# Patient Record
Sex: FEMALE | Race: WHITE | NOT HISPANIC OR LATINO | Employment: FULL TIME | URBAN - METROPOLITAN AREA
[De-identification: names, ages, dates, MRNs, and addresses within clinical notes are randomized per-mention and may not be internally consistent; named-entity substitution may affect disease eponyms.]

---

## 2017-06-01 ENCOUNTER — ALLSCRIPTS OFFICE VISIT (OUTPATIENT)
Dept: OTHER | Facility: OTHER | Age: 52
End: 2017-06-01

## 2017-10-11 ENCOUNTER — APPOINTMENT (OUTPATIENT)
Dept: RADIOLOGY | Facility: CLINIC | Age: 52
End: 2017-10-11
Payer: COMMERCIAL

## 2017-10-11 ENCOUNTER — OFFICE VISIT (OUTPATIENT)
Dept: URGENT CARE | Facility: CLINIC | Age: 52
End: 2017-10-11
Payer: COMMERCIAL

## 2017-10-11 ENCOUNTER — TRANSCRIBE ORDERS (OUTPATIENT)
Dept: URGENT CARE | Facility: CLINIC | Age: 52
End: 2017-10-11

## 2017-10-11 DIAGNOSIS — R05.9 COUGH: ICD-10-CM

## 2017-10-11 PROCEDURE — 94640 AIRWAY INHALATION TREATMENT: CPT

## 2017-10-11 PROCEDURE — 99213 OFFICE O/P EST LOW 20 MIN: CPT

## 2017-10-11 PROCEDURE — 71020 HB CHEST X-RAY 2VW FRONTAL&LATL: CPT

## 2017-10-13 NOTE — PROGRESS NOTES
Assessment  1  Asthmatic bronchitis (493 90) (J45 909)    Plan  Asthmatic bronchitis    · Albuterol Sulfate (2 5 MG/3ML) 0 083% Inhalation Nebulization Solution; USE 1  UNIT DOSE EVERY 4-6 HOURS AS NEEDED FOR WHEEZING    · Azithromycin 250 MG Oral Tablet (Zithromax Z-David); TAKE 2 TABLETS ON DAY 1  THEN TAKE 1 TABLET A DAY FOR 4 DAYS   · DrRx Phenergan with Codeine 118 ML; 1 tsp every 4 hrs as needed  Cough    · * XR CHEST PA & LATERAL; Status:Active - Retrospective By Protocol Authorization; Requested for:11Oct2017;     Discussion/Summary  Discussion Summary:   Increase fluids  Humidifier or vaporizer in the bedroom  Albuterol as needed for tightness and wheezing  Call the family doctor tomorrow and advise him of your condition  Understands and agrees with treatment plan: The treatment plan was reviewed with the patient/guardian  The patient/guardian understands and agrees with the treatment plan   Counseling Documentation With Imm: The patient, patient's family was counseled regarding instructions for management  Chief Complaint  1  Cough  Chief Complaint Free Text Note Form: Pt reports 1 week of a sore throat which has improved  Tmax at home is 100 9  She has a NP cough  Today she developed tightness in her breathing and wheezing  She started amoxicillin on Sunday  History of Present Illness  HPI: Short of breath, lungs feel tight  Note, a history of non-Hodgkin's lymphoma in 2005 which began with a cough and shortness of breath  Hospital Based Practices Required Assessment:   Pain Assessment   the patient states they have pain  The pain is located in the back  The patient describes the pain as sharp  (on a scale of 0 to 10, the patient rates the pain at 4 )   Abuse And Domestic Violence Screen    Yes, the patient is safe at home -The patient states no one is hurting them  Depression And Suicide Screen  No, the patient has not had thoughts of hurting themself     No, the patient has not felt depressed in the past 7 days  Prefered Language is  [place]  Primary Language is  English  Review of Systems  Focused-Female:   Respiratory: as noted in HPI  Active Problems  1  Hypothyroidism (244 9) (E03 9)   2  Menopausal state (627 2) (N95 1)   3  Motion sickness (994 6) (T75 3XXA)   4  Nausea (787 02) (R11 0)   5  Painful intercourse (625 0)   6  Katelyn-menopause (627 2) (N95 1)    Past Medical History  1  History of bacterial infection (V12 00) (Z86 19)   2  History of dysfunctional uterine bleeding (V13 29) (Z87 42)   3  History of dysmenorrhea (V13 29) (Z87 42)   4  History of non-Hodgkin's lymphoma (V10 79) (Z85 72)   5  History of Vaginal irritation (623 9) (N89 8)   6  History of Vulvitis (616 10) (N76 2)  Active Problems And Past Medical History Reviewed: The active problems and past medical history were reviewed and updated today  Family History  Mother    1  Family history of High blood pressure (401 9) (I10)   2  Family history of High cholesterol (272 0) (E78 00)  Father    3  Family history of Diabetes (250 00) (E11 9)   4  Denied: Family history of mental disorder   5  Denied: Family history of Substance addiction    Social History   · Birth Control Method - Partner Had Vasectomy   · Never A Smoker   · Never Drank Alcohol  Social History Reviewed: The social history was reviewed and updated today  Current Meds   1  Allegra Allergy 180 MG Oral Tablet Recorded   2  Ambien CR 6 25 MG Oral Tablet Extended Release; one tablet at night as needed   Recorded   3  Buffered Vitamin C 1000 MG Oral Capsule Recorded   4  Calcium/Magnesium/Zinc TABS Recorded   5  Cinnamon TABS Recorded   6  [employer] Vitamin C 1000 MG Oral Tablet; Take 1 tablet daily Recorded   7  Fish Oil 1000 MG Oral Capsule Recorded   8  Levothyroxine Sodium 75 MCG Oral Tablet; Take 1 tablet daily  Requested for:   [id number]; Last Rx:[id number] Ordered   9  Magnesium Oxide 400 MG Oral Tablet;  Take 1 tablet daily Recorded 10  Multi-Vitamin Oral Tablet; Take 1 tablet daily Recorded   11  Ondansetron HCl - 8 MG Oral Tablet; TAKE 1 TABLET EVERY 6 TO 8 HOURS AS    NEEDED NAUSEA Recorded   12  Oxycodone-Acetaminophen 5-325 MG Oral Tablet; TAKE 1 TO 2 TABLETS EVERY 6    HOURS AS NEEDED FOR PAIN;    Therapy: [id number] to (Evaluate:[id number]); Last Rx:[id number] Ordered   13  Percocet 5-325 MG Oral Tablet; TAKE 1 TABLET EVERY 6 HOURS AS NEEDED; Therapy: (Recorded:[id number]) to Recorded   14  Tranexamic Acid 650 MG Oral Tablet; TAKE 2 TABLET Every 8 hours PRN heavy bleeding; Therapy: [id number] to (Laure Chong)  Requested for: 82Cer6015; Last    Rx:[id number] Ordered    Allergies  1  No Known Drug Allergies    Vitals  Signs   Recorded: [id number] 05:33PM   Temperature: 97 8 F  Heart Rate: 96  Respiration: 18  Systolic: 212  Diastolic: 58  Height: 5 ft 5 5 in  Weight: 150 lb   BMI Calculated: 24 58  BSA Calculated: 1 76  O2 Saturation: 99    Physical Exam    Eyes   Conjunctiva and lids: No swelling, erythema or discharge  Ears, Nose, Mouth, and Throat   External inspection of ears and nose: Normal     Otoscopic examination: Tympanic membranes translucent with normal light reflex  Canals patent without erythema  Nasal mucosa, septum, and turbinates: Normal without edema or erythema  Oropharynx: Normal with no erythema, edema, exudate or lesions  Pulmonary   Respiratory effort: Abnormal     Auscultation of lungs: Abnormal  -Decreased breath sounds left base  Some end inspiratory wheezes particularly in the bases  Cardiovascular   Auscultation of heart: Normal rate and rhythm, normal S1 and S2, without murmurs  Results/Data  Diagnostic Studies Reviewed: I personally reviewed the films/images/results in the office today  My interpretation follows  X-ray Review Chest x-ray shows a markedly elevated left diaphragm  I could detect no fluid in the pleural spaces   Questionable increased bronchial markings in the right lower lobe        Signatures   Electronically signed by : MARIO ALBERTO Ellington ; Oct 11 2017  6:30PM EST                       (Author)

## 2017-11-03 ENCOUNTER — GENERIC CONVERSION - ENCOUNTER (OUTPATIENT)
Dept: OTHER | Facility: OTHER | Age: 52
End: 2017-11-03

## 2017-12-21 DIAGNOSIS — R92.2 INCONCLUSIVE MAMMOGRAM: ICD-10-CM

## 2017-12-21 DIAGNOSIS — Z12.31 ENCOUNTER FOR SCREENING MAMMOGRAM FOR MALIGNANT NEOPLASM OF BREAST: ICD-10-CM

## 2017-12-22 ENCOUNTER — GENERIC CONVERSION - ENCOUNTER (OUTPATIENT)
Dept: OBGYN CLINIC | Facility: MEDICAL CENTER | Age: 52
End: 2017-12-22

## 2017-12-22 ENCOUNTER — HOSPITAL ENCOUNTER (OUTPATIENT)
Dept: MAMMOGRAPHY | Facility: CLINIC | Age: 52
Discharge: HOME | End: 2017-12-22
Payer: COMMERCIAL

## 2017-12-22 DIAGNOSIS — R92.2 INCONCLUSIVE MAMMOGRAM: ICD-10-CM

## 2017-12-22 DIAGNOSIS — Z12.31 ENCOUNTER FOR SCREENING MAMMOGRAM FOR MALIGNANT NEOPLASM OF BREAST: ICD-10-CM

## 2017-12-22 PROCEDURE — G0202 SCR MAMMO BI INCL CAD: HCPCS

## 2017-12-22 PROCEDURE — 77063 BREAST TOMOSYNTHESIS BI: CPT

## 2018-01-13 VITALS
WEIGHT: 155.88 LBS | HEART RATE: 68 BPM | TEMPERATURE: 97.9 F | SYSTOLIC BLOOD PRESSURE: 110 MMHG | HEIGHT: 66 IN | DIASTOLIC BLOOD PRESSURE: 80 MMHG | BODY MASS INDEX: 25.05 KG/M2 | RESPIRATION RATE: 14 BRPM

## 2018-01-18 NOTE — RESULT NOTES
Verified Results  * XR CHEST PA & LATERAL [id number] 05:49PM Rochelle Aguirre Order Number: [id number]     Test Name Result Flag Reference   XR CHEST PA & LATERAL (Report)     CHEST      INDICATION: Cough     COMPARISON: None     VIEWS: Frontal and lateral projections     IMAGES: 4     FINDINGS:        Cardiomediastinal silhouette appears unremarkable  The lungs are clear  No pneumothorax or pleural effusion  Visualized osseous structures appear within normal limits for the patient's age  IMPRESSION:     No active pulmonary disease         Workstation performed: [id number]     Signed by:   Raheem Tanner MD   10/11/17

## 2018-02-12 DIAGNOSIS — E03.9 HYPOTHYROIDISM, UNSPECIFIED TYPE: Primary | ICD-10-CM

## 2018-02-12 RX ORDER — LEVOTHYROXINE SODIUM 0.07 MG/1
75 TABLET ORAL DAILY
Qty: 90 TABLET | Refills: 1 | Status: SHIPPED | OUTPATIENT
Start: 2018-02-12

## 2019-07-01 ENCOUNTER — TELEPHONE (OUTPATIENT)
Dept: FAMILY MEDICINE CLINIC | Facility: CLINIC | Age: 54
End: 2019-07-01

## 2019-07-02 ENCOUNTER — TELEPHONE (OUTPATIENT)
Dept: OTHER | Facility: OTHER | Age: 54
End: 2019-07-02